# Patient Record
Sex: FEMALE | Race: WHITE | Employment: OTHER | URBAN - METROPOLITAN AREA
[De-identification: names, ages, dates, MRNs, and addresses within clinical notes are randomized per-mention and may not be internally consistent; named-entity substitution may affect disease eponyms.]

---

## 2021-02-01 PROBLEM — H65.22 LEFT CHRONIC SEROUS OTITIS MEDIA: Status: ACTIVE | Noted: 2021-02-01

## 2021-02-01 PROBLEM — E55.9 VITAMIN D DEFICIENCY: Status: ACTIVE | Noted: 2021-02-01

## 2021-02-01 PROBLEM — E66.9 OBESITY: Status: ACTIVE | Noted: 2021-02-01

## 2021-02-01 PROBLEM — R53.81 OTHER MALAISE AND FATIGUE: Status: ACTIVE | Noted: 2021-02-01

## 2021-02-01 PROBLEM — R53.83 OTHER MALAISE AND FATIGUE: Status: ACTIVE | Noted: 2021-02-01

## 2021-02-01 PROBLEM — R09.81 SINUS CONGESTION: Status: ACTIVE | Noted: 2021-02-01

## 2021-02-03 PROBLEM — E66.9 OBESITY: Status: RESOLVED | Noted: 2021-02-01 | Resolved: 2021-02-03

## 2022-06-21 ENCOUNTER — HOSPITAL ENCOUNTER (OUTPATIENT)
Dept: PHYSICAL THERAPY | Age: 37
Discharge: HOME | End: 2022-06-21
Payer: MEDICAID

## 2022-06-21 PROCEDURE — 97162 PT EVAL MOD COMPLEX 30 MIN: CPT

## 2022-06-21 NOTE — PROGRESS NOTES
[facility]  [address], [address]  Ph [phone]  Fx [phone]    Plan of Care/ Statement of Necessity for Physical Therapy Services    Patient name: Jenn Liriano Start of Care: 2022   Referral source: Valery Mosher NP : 1985    Medical Diagnosis: Other symptoms and signs involving the genitourinary system [R39.89]   Onset Date:2014    Treatment Diagnosis:  Other symptoms and signs involving the genitourinary system                                              ICD-10: R39.89   Prior Hospitalization: see medical history Provider#: [id number]   Medications: Verified on Patient summary List    Comorbidities: LEEP, 5 pregnacies/ 1 live birth vaginal with 2 degree tear, tonscilectomy   Prior Level of Function: active lifestyle, massage therapist, hiking      The Plan of Care and following information is based on the information from the initial evaluation. Assessment/ key information: Patient is a 39year old female presenting to Physical Therapy with c/o heaviness and pressure resulting from a rectocele which is limiting ability function at previous level. Patient has pressure and heaviness  pain complaints with prolong standing or walking. Patient reports needing to splint  Patient presents with decreased strength, coordination, and endurance of the pelvic floor muscles and decreased strength of postural stabilizers. Patient presents with limited  understanding of bladder and bowel anatomy/function, fiber, and bowel massage. I feel patient would benefit from skilled therapeutic intervention to optimize highest functional level possible.      Evaluation Complexity History HIGH Complexity :3+ comorbidities / personal factors will impact the outcome/ POC ; Examination HIGH Complexity : 4+ Standardized tests and measures addressing body structure, function, activity limitation and / or participation in recreation  ;Presentation MEDIUM Complexity : Evolving with changing characteristics  ; Clinical Decision Making MEDIUM Complexity : FOTO score of 26-74 : FOTO score = an established functional score where 100 = no disability  Overall Complexity Rating: MEDIUM    Problem List: pain affecting function, decrease strength and decrease activity tolerance   Treatment Plan may include any combination of the following: Therapeutic exercise, Therapeutic activities, Neuromuscular re-education, Physical agent/modality, Gait/balance training, Manual therapy, Patient education, Self Care training and Functional mobility training  Patient / Family readiness to learn indicated by: asking questions, trying to perform skills and interest  Persons(s) to be included in education: patient (P)  Barriers to Learning/Limitations: None  Measures taken if barriers to learning: none  Patient Goal (s): strengthen my muscles to retrain my body  Patient Self Reported Health Status: good  Rehabilitation Potential: good  Short term goals: To be achieved in 6 weeks:  Patient will demonstrate proper dietary/fluid habits    that promote bladder and bowel health to aid in management of constipation. Status at eval: Patient consuming enough water but unaware of bowel routine, toileting positioning, and role of fiber types     Patient will report improvement in bowel movement as noted by more complete bowel movements with [place] scale of 4 . Status at eval:  Patient reports small bowel movements of [place] scale 4-5        Patient will be able to maintain pelvic floor contraction for 8 seconds, 8 repetitions with no accessory substitution or breath holding.   Status at eval: PFMC 7 seconds, 6 repetitions with abdominal substitutions and breath holding     Patient will report heaviness/pressure associated with rectocele as no more than 3/10  Status at eval:  5/10 at worst      Patient will report a 50% decrease in needing to splint to have a complete bowel movement  Status at eval: Patient reports splinting 1x/week     Long term goals: To be achieved in 12 weeks:     Patient will report a 75% decrease in needing to splint to have a complete bowel movement  Status at eval:  Patient reports splinting 1x/week     Patient will report heaviness/pressure associated with rectocele as no more than 3/10  Status at eval:  5/10 at worst      Patient will demonstrate pelvic floor muscle contraction 3/5 and ability to maintain contraction for 10 seconds, 10 repetitions. Status at eval: PFM 2/5, 7 second hold, 6 repetitions, with abdominal  substitution     Patient will demonstrate improvement of current complaints evidenced by a 6 point  improvement in FOTO, Pelvic functional disability index score  Status at Eval: Pelvic functional disability index 52     Patient will demonstrate independence with management tools and exercise program that are beneficial for current condition in order to feel comfortable with Pelvic floor PT D/C and not fear exacerbation of current condition or symptoms returning. Status at eval : patient is uncertain of what exercises to do to decrease her symptoms and unaware of what activities to avoid to avoid exacerbation of current condition       Frequency / Duration: Patient to be seen 1 times per week for 12 weeks. Patient/ Caregiver education and instruction: Diagnosis, prognosis, self care, activity modification and exercises   [x]  Plan of care has been reviewed with PTA    Certification Period: boo Rojo, PT 6/21/2022 11:59 AM    ________________________________________________________________________    I certify that the above Therapy Services are being furnished while the patient is under my care. I agree with the treatment plan and certify that this therapy is necessary.     [de-identified] Signature:_____________________Date:____________TIME:________                                      Dominga Koch NP      ** Signature, Date and Time must be completed for valid certification **  Please sign and return to [facility] at [facility]  [address], [address]  Ph [phone]  Fx [phone]

## 2022-06-21 NOTE — PROGRESS NOTES
Physical Therapy Evaluation        Patient Name: Jenn Liriano  Date:2022  : 1985  [x]  Patient  Verified  Payor: Kar Chapa / [place] / Product Type: Managed Care Medicaid /    In time: 473  Out time:1014  Total Treatment Time (min): 44  Visit #: 1 of 12    Medicare/BCBS Only   Total Timed Codes (min):  44 1:1 Treatment Time:  44       Treatment Area: Other symptoms and signs involving the genitourinary system [R39.89]    SUBJECTIVE  Pain Level (0-10 scale): 5/10  []constant []intermittent []improving []worsening []no change since onset    Any medication changes, allergies to medications, adverse drug reactions, diagnosis change, or new procedure performed?: [x] No    [] Yes (see summary sheet for update)  Subjective functional status/changes:     PLOF: active lifestyle, massage therapist, hiking  Limitations to PLOF: must splint for some bowel movements (1x/week), not feeling adequate sexually, constipation so bad she can't exercise  (constipation is worse issue for her )  Mechanism of Injury: child birth 2014  Current symptoms/Complaints: heaviness/pressure, constipation, must splint sometimes  Previous Treatment/Compliance: none, anticipates surgery (reevaluating in September and will schedule surgery at that time)  PMHx/Surgical Hx: LEEP, 5 pregnacies/ 1 live birth vaginal with 2 degree tear, tonscilectomy,   Work Hx: massage therapist  Living Situation: lives with her son and mother  Pt Goals: strengthen my muscles to retrain my body  Barriers: []pain []financial []time []transportation []other  Motivation: highly  Substance use: [x]Alcohol []Tobacco []other:   Cognition: A & O x 3   Other:    Current urinary complaint  None    Bladder complaint longevity:  NA    Bladder symptom progression:  not a problem            Daytime urinary frequency:  Every 3-4 hour(s) during the day    Nocturia:1x/ night    Patient has failed previous pelvic floor muscle training? [] Yes    [x] No    Bowel function:  Constipation,  hard stool with straining  Stool Type (Union): 4-5 but small amounts  Toileting position/modifications: no    Fecal Incontinence present? no    Pain complaint:  lower abdomen  low back  heaviness from rectocele    Pain scale:  abdominal and LBP with constipation  at worst 8/10   Heaviness/fullness from the rectocele: At best = 0/10 and worst 5-6/10      Diet: no dairy; similar to Myplate but a little low on grains and vegetables    Fluid intake:    Fluid  Amount per day   Water  64-70 ounces   Caffeine  16 ounces of coffee   Alcohol  rarely    gatorade  couple times a week 12 ounces               Pelvic Floor Assessment  Patient was educated on pelvic floor anatomy, structure, and function and implications for current presentation of s/s. Patient consents to pelvic floor assessment.      External trigger point/ muscle tenderness:    Superficial PFM tenderness/restriction   Right/center Left   Bulbocavernosus (bulbospongiosus) none none   Ischiocavernosus none none   Superficial Transverse Perineal none none   Perineal body none    Levator Ani none none              PFM Screen:    Skin Integrity:  [x] Healthy [] Red  [] Labia Atrophy [] Fragile    Sensation: [x] Intact [] Diminished:    Muscle Bulk: [x] Symmetrical  [] Well-developed [] Atrophied:  []L   []R   []B    Prolapse: [] Cystocele:   [x] Rectocele:  [] Uterine prolapse:     Ability to actively bulge: yes Bulge with cough yes; bulge slight with knack prior to cough    Pelvic floor manual exam: Performed via internal digital vaginal assessment  female-upon vaginal palpation of the pelvic floor, the following was noted: good pelvic floor tone and tension with no pain upon palpation  Introitus restriction/TTP (reported as hands on a clock): no restrictions      Deep PFM Tenderness/Restriction:    Right/center Left   pubococcygeus none none   Ischiococcygeus none none   Iliococcygeus none none   Obturator Internus none none   coccyx none             Pelvic floor MMT    PERF (Performance/Endurance/Repetitions//Flicks): 8/2/8//5      Pelvic muscle release pattern  2 - partial, delayed release        44 min [x]Eval                  []Re-Eval             With   [] TE   [] TA   [] neuro   [] other: Patient Education: [x] Review HEP    [] Progressed/Changed HEP based on:   [] positioning   [] body mechanics   [] transfers   [] heat/ice application    [] other:           Pain Level (0-10 scale) post treatment: 0/10    ASSESSMENT/Changes in Function: Patient is a 39year old female presenting to Physical Therapy with c/o heaviness and pressure resulting from a rectocele which is limiting ability function at previous level. Patient has pressure and heaviness  pain complaints with prolong standing or walking. Patient reports needing to splint  Patient presents with decreased strength, coordination, and endurance of the pelvic floor muscles and decreased strength of postural stabilizers. Patient presents with limited  understanding of bladder and bowel anatomy/function, fiber, and bowel massage. I feel patient would benefit from skilled therapeutic intervention to optimize highest functional level possible. Patient will continue to benefit from skilled PT services to modify and progress therapeutic interventions, address functional mobility deficits, address ROM deficits, address strength deficits, analyze and address soft tissue restrictions, analyze and cue movement patterns, analyze and modify body mechanics/ergonomics and assess and modify postural abnormalities to attain remaining goals. [x]  See Plan of Care  []  See progress note/recertification  []  See Discharge Summary         Progress towards goals / Updated goals:  Short term goals: To be achieved in 6 weeks:  Patient will demonstrate proper dietary/fluid habits    that promote bladder and bowel health to aid in management of constipation.   Status at eval: Patient consuming enough water but unaware of bowel routine, toileting positioning, and role of fiber types    Patient will report improvement in bowel movement as noted by more complete bowel movements with [place] scale of 4 . Status at eval:  Patient reports small bowel movements of [place] scale 4-5      Patient will be able to maintain pelvic floor contraction for 8 seconds, 8 repetitions with no accessory substitution or breath holding. Status at eval: PFMC 7 seconds, 6 repetitions with abdominal substitutions and breath holding    Patient will report heaviness/pressure associated with rectocele as no more than 3/10  Status at eval:  5/10 at worst     Patient will report a 50% decrease in needing to splint to have a complete bowel movement  Status at eval:  Patient reports splinting 1x/week    Long term goals: To be achieved in 12 weeks:    Patient will report a 75% decrease in needing to splint to have a complete bowel movement  Status at eval:  Patient reports splinting 1x/week    Patient will report heaviness/pressure associated with rectocele as no more than 3/10  Status at eval:  5/10 at worst     Patient will demonstrate pelvic floor muscle contraction 3/5 and ability to maintain contraction for 10 seconds, 10 repetitions. Status at eval: PFM 2/5, 7 second hold, 6 repetitions, with abdominal  substitution    Patient will demonstrate improvement of current complaints evidenced by a 6 point  improvement in FOTO, Pelvic functional disability index score  Status at Eval: Pelvic functional disability index 52    Patient will demonstrate independence with management tools and exercise program that are beneficial for current condition in order to feel comfortable with Pelvic floor PT D/C and not fear exacerbation of current condition or symptoms returning.    Status at eval : patient is uncertain of what exercises to do to decrease her symptoms and unaware of what activities to avoid to avoid exacerbation of current condition    PLAN  []  Upgrade activities as tolerated     [x]  Continue plan of care  []  Update interventions per flow sheet       []  Discharge due to:_  []  Other:_      Vi Menendez, PT 6/21/2022  9:26 AM

## 2022-06-29 ENCOUNTER — TELEPHONE (OUTPATIENT)
Dept: PHYSICAL THERAPY | Age: 37
End: 2022-06-29

## 2022-07-08 ENCOUNTER — TELEPHONE (OUTPATIENT)
Dept: PHYSICAL THERAPY | Age: 37
End: 2022-07-08

## 2022-07-15 ENCOUNTER — TELEPHONE (OUTPATIENT)
Dept: PHYSICAL THERAPY | Age: 37
End: 2022-07-15

## 2022-07-22 ENCOUNTER — TELEPHONE (OUTPATIENT)
Dept: PHYSICAL THERAPY | Age: 37
End: 2022-07-22

## 2022-07-29 ENCOUNTER — APPOINTMENT (OUTPATIENT)
Dept: PHYSICAL THERAPY | Age: 37
End: 2022-07-29

## 2022-08-17 NOTE — PROGRESS NOTES
[facility]  [address], [address], [address] [facility]  Phone [phone]  Fax [phone]    Physical Therapy Discharge Summary    Patient name: Jenn Liriano Start of Care: 2022   Referral source: Hakeem Lockhart NP : 1985                Medical Diagnosis: Other symptoms and signs involving the genitourinary system [R39.89]    Onset Date:2014                Treatment Diagnosis:  Other symptoms and signs involving the genitourinary system                                              ICD-10: R39.89   Prior Hospitalization: see medical history Provider#: [id number]   Medications: Verified on Patient summary List    Comorbidities: LEEP, 5 pregnacies/ 1 live birth vaginal with 2 degree tear, tonscilectomy   Prior Level of Function: active lifestyle, massage therapist, hiking    Visits from Start of Care: 1    Missed Visits: 4    Reporting Period : 22 to 22    Assessment / Summary of Care:  Unable to formally assess goals as patient failed to show for scheduled followup appointments. Please see below for the most recent goals assessment while patient was under the care of this PT clinic. Please D/C to HEP at this time. Patient will require new order if she requires further physical therapy services. Thank you for the referral to [facility]. Short term goals: To be achieved in 6 weeks:  Patient will demonstrate proper dietary/fluid habits    that promote bladder and bowel health to aid in management of constipation. Status at eval: Patient consuming enough water but unaware of bowel routine, toileting positioning, and role of fiber types     Patient will report improvement in bowel movement as noted by more complete bowel movements with [place] scale of 4 .   Status at eval:  Patient reports small bowel movements of [place] scale 4-5        Patient will be able to maintain pelvic floor contraction for 8 seconds, 8 repetitions with no accessory substitution or breath holding. Status at eval: PFMC 7 seconds, 6 repetitions with abdominal substitutions and breath holding     Patient will report heaviness/pressure associated with rectocele as no more than 3/10  Status at eval:  5/10 at worst      Patient will report a 50% decrease in needing to splint to have a complete bowel movement  Status at eval:  Patient reports splinting 1x/week     Long term goals: To be achieved in 12 weeks:     Patient will report a 75% decrease in needing to splint to have a complete bowel movement  Status at eval:  Patient reports splinting 1x/week     Patient will report heaviness/pressure associated with rectocele as no more than 3/10  Status at eval:  5/10 at worst      Patient will demonstrate pelvic floor muscle contraction 3/5 and ability to maintain contraction for 10 seconds, 10 repetitions. Status at eval: PFM 2/5, 7 second hold, 6 repetitions, with abdominal  substitution     Patient will demonstrate improvement of current complaints evidenced by a 6 point  improvement in FOTO, Pelvic functional disability index score  Status at Eval: Pelvic functional disability index 52     Patient will demonstrate independence with management tools and exercise program that are beneficial for current condition in order to feel comfortable with Pelvic floor PT D/C and not fear exacerbation of current condition or symptoms returning. Status at eval : patient is uncertain of what exercises to do to decrease her symptoms and unaware of what activities to avoid to avoid exacerbation of current condition    RECOMMENDATIONS:  [x]Discontinue therapy: []Patient has reached or is progressing toward set goals      [x]Patient is non-compliant or has abdicated      []Due to lack of appreciable progress towards set goals    Thank you for the referral to [facility]!     Christian Canas, PT  8/17/2022   12:59 PM    ------------------------------------------------------------------------------------------------------------------------  NOTE TO PHYSICIAN:  Please complete the following and fax to: [facility] at THE [facility] at [phone]  If you are unable to process this request in   24 hours, please contact our office. [] I have read the above report and request that my patient continue therapy with the following changes/special instructions:  [] I have read the above report and request that my patient be discharged from therapy.     [de-identified] Signature:____________________ Date:_________ TIME:________                                        Marissa Roman NP      ** Signature, Date and Time must be completed for valid certification **